# Patient Record
Sex: MALE | Race: WHITE
[De-identification: names, ages, dates, MRNs, and addresses within clinical notes are randomized per-mention and may not be internally consistent; named-entity substitution may affect disease eponyms.]

---

## 2017-02-04 ENCOUNTER — HOSPITAL ENCOUNTER (INPATIENT)
Dept: HOSPITAL 25 - ED | Age: 42
LOS: 2 days | Discharge: HOME | DRG: 53 | End: 2017-02-06
Attending: INTERNAL MEDICINE | Admitting: HOSPITALIST
Payer: COMMERCIAL

## 2017-02-04 DIAGNOSIS — R74.8: ICD-10-CM

## 2017-02-04 DIAGNOSIS — F14.10: ICD-10-CM

## 2017-02-04 DIAGNOSIS — Z88.0: ICD-10-CM

## 2017-02-04 DIAGNOSIS — E86.1: ICD-10-CM

## 2017-02-04 DIAGNOSIS — E87.1: ICD-10-CM

## 2017-02-04 DIAGNOSIS — Z82.49: ICD-10-CM

## 2017-02-04 DIAGNOSIS — F10.10: ICD-10-CM

## 2017-02-04 DIAGNOSIS — G40.909: Primary | ICD-10-CM

## 2017-02-04 LAB
ALBUMIN SERPL BCG-MCNC: 4.5 G/DL (ref 3.2–5.2)
ALP SERPL-CCNC: 85 U/L (ref 34–104)
ALT SERPL W P-5'-P-CCNC: 33 U/L (ref 7–52)
ANION GAP SERPL CALC-SCNC: 10 MMOL/L (ref 2–11)
APAP SERPL-MCNC: < 15 MCG/ML
AST SERPL-CCNC: 46 U/L (ref 13–39)
BUN SERPL-MCNC: 9 MG/DL (ref 6–24)
BUN/CREAT SERPL: 9.1 (ref 8–20)
CALCIUM SERPL-MCNC: 9.7 MG/DL (ref 8.6–10.3)
CHLORIDE SERPL-SCNC: 80 MMOL/L (ref 101–111)
CK SERPL-CCNC: 480 U/L (ref 10–223)
GLOBULIN SER CALC-MCNC: 4 G/DL (ref 2–4)
GLUCOSE SERPL-MCNC: 111 MG/DL (ref 70–100)
HCO3 SERPL-SCNC: 31 MMOL/L (ref 22–32)
HCT VFR BLD AUTO: 48 % (ref 42–52)
HGB BLD-MCNC: 15.9 G/DL (ref 14–18)
MAGNESIUM SERPL-MCNC: 2.6 MG/DL (ref 1.9–2.7)
MCH RBC QN AUTO: 31 PG (ref 27–31)
MCHC RBC AUTO-ENTMCNC: 34 G/DL (ref 31–36)
MCV RBC AUTO: 92 FL (ref 80–94)
POTASSIUM SERPL-SCNC: 3.5 MMOL/L (ref 3.5–5)
PROT SERPL-MCNC: 8.5 G/DL (ref 6.4–8.9)
RBC # BLD AUTO: 5.17 10^6/UL (ref 4–5.4)
SODIUM SERPL-SCNC: 121 MMOL/L (ref 133–145)
TROPONIN I SERPL-MCNC: 0.05 NG/ML (ref ?–0.04)
TSH SERPL-ACNC: 1.72 MCIU/ML (ref 0.34–5.6)
WBC # BLD AUTO: 11.7 10^3/UL (ref 3.5–10.8)

## 2017-02-04 PROCEDURE — 80307 DRUG TEST PRSMV CHEM ANLYZR: CPT

## 2017-02-04 PROCEDURE — 36415 COLL VENOUS BLD VENIPUNCTURE: CPT

## 2017-02-04 PROCEDURE — 93306 TTE W/DOPPLER COMPLETE: CPT

## 2017-02-04 PROCEDURE — 82140 ASSAY OF AMMONIA: CPT

## 2017-02-04 PROCEDURE — 85025 COMPLETE CBC W/AUTO DIFF WBC: CPT

## 2017-02-04 PROCEDURE — 82550 ASSAY OF CK (CPK): CPT

## 2017-02-04 PROCEDURE — 70450 CT HEAD/BRAIN W/O DYE: CPT

## 2017-02-04 PROCEDURE — 80053 COMPREHEN METABOLIC PANEL: CPT

## 2017-02-04 PROCEDURE — 70551 MRI BRAIN STEM W/O DYE: CPT

## 2017-02-04 PROCEDURE — G0378 HOSPITAL OBSERVATION PER HR: HCPCS

## 2017-02-04 PROCEDURE — 80329 ANALGESICS NON-OPIOID 1 OR 2: CPT

## 2017-02-04 PROCEDURE — 84443 ASSAY THYROID STIM HORMONE: CPT

## 2017-02-04 PROCEDURE — 71010: CPT

## 2017-02-04 PROCEDURE — G0480 DRUG TEST DEF 1-7 CLASSES: HCPCS

## 2017-02-04 PROCEDURE — 84484 ASSAY OF TROPONIN QUANT: CPT

## 2017-02-04 PROCEDURE — 87641 MR-STAPH DNA AMP PROBE: CPT

## 2017-02-04 PROCEDURE — 93005 ELECTROCARDIOGRAM TRACING: CPT

## 2017-02-04 PROCEDURE — 80048 BASIC METABOLIC PNL TOTAL CA: CPT

## 2017-02-04 PROCEDURE — 95819 EEG AWAKE AND ASLEEP: CPT

## 2017-02-04 PROCEDURE — 81015 MICROSCOPIC EXAM OF URINE: CPT

## 2017-02-04 PROCEDURE — 83605 ASSAY OF LACTIC ACID: CPT

## 2017-02-04 PROCEDURE — 83735 ASSAY OF MAGNESIUM: CPT

## 2017-02-04 PROCEDURE — 81003 URINALYSIS AUTO W/O SCOPE: CPT

## 2017-02-04 PROCEDURE — 80320 DRUG SCREEN QUANTALCOHOLS: CPT

## 2017-02-04 RX ADMIN — LORAZEPAM SCH MG: 1 TABLET ORAL at 23:08

## 2017-02-04 RX ADMIN — HEPARIN SODIUM SCH UNITS: 5000 INJECTION INTRAVENOUS; SUBCUTANEOUS at 23:07

## 2017-02-04 NOTE — RAD
Indication: Altered mental status. Suspected overdose.



Comparison: None.



Technique: Sitting AP chest



Report: Clear lungs and pleural spaces. Negative for pneumothorax. The heart, pulmonary

vasculature, and mediastinal contours are unremarkable. Unremarkable osseous structures

and soft tissue contours.



IMPRESSION: No evidence for acute intrathoracic disease.

## 2017-02-04 NOTE — ED
Vish WEATHERS Karl, scribed for Willam Diallo MD on 02/04/17 at 1935 .





Substance Abuse/Use





- HPI Summary


HPI Summary: 


40 y/o M BIBA after being found unresponsive by his parents while he was with 

his daughter. EMS on scene said that they arrived as the pt was waking up but 

he was disoriented and having memory problems. Pt stated that he does not 

remember anything from earlier today except that he saw his daughter. Pt has no 

other complaints and no pain. Pt denied SOB, CP, palpitations, nausea, vomiting

, and diarrhea. 





- History Of Current Complaint


Chief Complaint: EDSubstanceAbuse


Stated Complaint: OVERDOSE


Time Seen by Provider: 02/04/17 19:24


Hx Obtained From: Patient, EMS


Onset/Duration  of Drug/ETOH Abuse: Hours


Severity Initially: Moderate


Severity Currently: Moderate


Character: Stuporous


Aggravating Factor(s): Nothing


Alleviating Factor(s): Nothing


Associated Signs And Symptoms: Confused, Altered Mental Status, Other: - memory 

loss





- Allergies/Home Medications


Allergies/Adverse Reactions: 


 Allergies











Allergy/AdvReac Type Severity Reaction Status Date / Time


 


Penicillins Allergy Severe Anaphylatic Verified 02/04/17 19:32





   Shock  














PMH/Surg Hx/FS Hx/Imm Hx


Previously Healthy: Yes


Endocrine/Hematology History: 


   Denies: Hx Diabetes


Cardiovascular History: 


   Denies: Hx Hypertension - not diagnosed but runs highoccassionally, Hx 

Pacemaker/ICD


 History: 


   Denies: Hx Renal Disease


Musculoskeletal History: Reports: Hx Scoliosis - DX AS A CHILD


Sensory History: 


   Denies: Hx Hearing Aid


Neurological History: 


   Denies: Hx Headaches


Psychiatric History: 


   Denies: Hx Panic Disorder





- Surgical History


Surgery Procedure, Year, and Place: right hand reconstruction


Infectious Disease History: 


   Denies: History Other Infectious Disease





- Family History


Known Family History: 


   Negative: Cardiac Disease





- Social History


Alcohol Use: Daily


Alcohol Amount: Pt reports normally drinks 5-6beers daily, patient unsure when 

he last dran


Substance Use Type: Reports: Cocaine


Smoking Status (MU): Never Smoked Tobacco





Review of Systems


Constitutional: Negative


Eyes: Negative


ENT: Negative


Cardiovascular: Negative


Respiratory: Negative


Gastrointestinal: Negative


Genitourinary: Negative


Musculoskeletal: Negative


Skin: Negative


Neurological: Other - AMS, memory loss


Psychological: Other - AMS


All Other Systems Reviewed And Are Negative: Yes





Physical Exam





- Summary


Physical Exam Summary: 





VITAL SIGNS: Reviewed. 


GENERAL: Patient is a well developed and nourished male who is lying 

comfortable in the stretcher. Patient is not in any acute respiratory distress. 


HEAD AND FACE: No signs of trauma. No ecchymosis, hematomas or skull 

depressions. No sinus tenderness. 


EYES: PERRLA, EOMI x 2, No injected conjunctiva, no nystagmus. No photophobia.


EARS: Hearing grossly intact. Ear canals and tympanic membranes are within 

normal limits. 


MOUTH: Oropharynx within normal limits. 


NECK: Supple, trachea is midline, no adenopathy, no JVD, no carotid bruit, no c-

spine tenderness, neck with full ROM. No meningeal signs, no Kernig's or 

brudzinskis signs. 


CHEST: Symmetric, no tenderness at palpation 


LUNGS: Clear to auscultation bilaterally. No wheezing or crackles.


CVS: Regular rate and rhythm, S1 and S2 present, no murmurs or gallops 

appreciated. 


ABDOMEN: Soft, non-tender. No signs of distention. No rebound no guarding, and 

no masses palpated. Bowel sounds are normal. 


EXTREMITIES: FROM in all major joints, no edema, no cyanosis or clubbing.


NEURO: Alert and oriented x 1. No acute neurological deficits. Speech is normal 

and follows commands. 


SKIN: Dry and warm





Vital Signs On Initial Exam: 


 Initial Vitals











Temp Pulse Resp BP Pulse Ox


 


 98.5 F   107   16   132/92   98 


 


 02/04/17 19:21  02/04/17 19:21  02/04/17 19:21  02/04/17 19:21  02/04/17 19:21














Diagnostics





- Vital Signs


 Vital Signs











  Temp Pulse Resp BP Pulse Ox


 


 02/04/17 19:21  98.5 F  107  16  132/92  98














- Laboratory


Lab Results: 


 Lab Results











  02/04/17 02/04/17 02/04/17 Range/Units





  19:39 19:39 19:39 


 


WBC  11.7 H    (3.5-10.8)  10^3/ul


 


RBC  5.17    (4.0-5.4)  10^6/ul


 


Hgb  15.9    (14.0-18.0)  g/dl


 


Hct  48    (42-52)  %


 


MCV  92    (80-94)  fL


 


MCH  31    (27-31)  pg


 


MCHC  34    (31-36)  g/dl


 


RDW  13    (10.5-15)  %


 


Plt Count  241    (150-450)  10^3/ul


 


MPV  7 L    (7.4-10.4)  um3


 


Neut % (Auto)  80.1    (38-83)  %


 


Lymph % (Auto)  6.2 L    (25-47)  %


 


Mono % (Auto)  11.2 H    (1-9)  %


 


Eos % (Auto)  1.7    (0-6)  %


 


Baso % (Auto)  0.8    (0-2)  %


 


Absolute Neuts (auto)  9.3 H    (1.5-7.7)  10^3/ul


 


Absolute Lymphs (auto)  0.7 L    (1.0-4.8)  10^3/ul


 


Absolute Monos (auto)  1.3 H    (0-0.8)  10^3/ul


 


Absolute Eos (auto)  0.2    (0-0.6)  10^3/ul


 


Absolute Basos (auto)  0.1    (0-0.2)  10^3/ul


 


Absolute Nucleated RBC  0.01    10^3/ul


 


Nucleated RBC %  0.1    


 


Sodium   121 L   (133-145)  mmol/L


 


Potassium   3.5   (3.5-5.0)  mmol/L


 


Chloride   80 L   (101-111)  mmol/L


 


Carbon Dioxide   31   (22-32)  mmol/L


 


Anion Gap   10   (2-11)  mmol/L


 


BUN   9   (6-24)  mg/dL


 


Creatinine   0.99   (0.67-1.17)  mg/dL


 


Est GFR ( Amer)   108.2   (>60)  


 


Est GFR (Non-Af Amer)   84.2   (>60)  


 


BUN/Creatinine Ratio   9.1   (8-20)  


 


Glucose   111 H   ()  mg/dL


 


Lactic Acid     (0.5-2.0)  mmol/L


 


Calcium   9.7   (8.6-10.3)  mg/dL


 


Magnesium   2.6   (1.9-2.7)  mg/dL


 


Total Bilirubin   1.40 H   (0.2-1.0)  mg/dL


 


AST   46 H   (13-39)  U/L


 


ALT   33   (7-52)  U/L


 


Alkaline Phosphatase   85   ()  U/L


 


Ammonia    40  (16-53)  mol/L


 


Total Creatine Kinase   480 H   ()  U/L


 


Troponin I   0.05 H*   (<0.04)  ng/mL


 


Total Protein   8.5   (6.4-8.9)  g/dL


 


Albumin   4.5   (3.2-5.2)  g/dL


 


Globulin   4.0   (2-4)  g/dL


 


Albumin/Globulin Ratio   1.1   (1-3)  


 


TSH   Pending   


 


Acetaminophen   Pending   


 


Serum Alcohol   Pending   














  02/04/17 Range/Units





  19:39 


 


WBC   (3.5-10.8)  10^3/ul


 


RBC   (4.0-5.4)  10^6/ul


 


Hgb   (14.0-18.0)  g/dl


 


Hct   (42-52)  %


 


MCV   (80-94)  fL


 


MCH   (27-31)  pg


 


MCHC   (31-36)  g/dl


 


RDW   (10.5-15)  %


 


Plt Count   (150-450)  10^3/ul


 


MPV   (7.4-10.4)  um3


 


Neut % (Auto)   (38-83)  %


 


Lymph % (Auto)   (25-47)  %


 


Mono % (Auto)   (1-9)  %


 


Eos % (Auto)   (0-6)  %


 


Baso % (Auto)   (0-2)  %


 


Absolute Neuts (auto)   (1.5-7.7)  10^3/ul


 


Absolute Lymphs (auto)   (1.0-4.8)  10^3/ul


 


Absolute Monos (auto)   (0-0.8)  10^3/ul


 


Absolute Eos (auto)   (0-0.6)  10^3/ul


 


Absolute Basos (auto)   (0-0.2)  10^3/ul


 


Absolute Nucleated RBC   10^3/ul


 


Nucleated RBC %   


 


Sodium   (133-145)  mmol/L


 


Potassium   (3.5-5.0)  mmol/L


 


Chloride   (101-111)  mmol/L


 


Carbon Dioxide   (22-32)  mmol/L


 


Anion Gap   (2-11)  mmol/L


 


BUN   (6-24)  mg/dL


 


Creatinine   (0.67-1.17)  mg/dL


 


Est GFR ( Amer)   (>60)  


 


Est GFR (Non-Af Amer)   (>60)  


 


BUN/Creatinine Ratio   (8-20)  


 


Glucose   ()  mg/dL


 


Lactic Acid  2.6 H*  (0.5-2.0)  mmol/L


 


Calcium   (8.6-10.3)  mg/dL


 


Magnesium   (1.9-2.7)  mg/dL


 


Total Bilirubin   (0.2-1.0)  mg/dL


 


AST   (13-39)  U/L


 


ALT   (7-52)  U/L


 


Alkaline Phosphatase   ()  U/L


 


Ammonia   (16-53)  mol/L


 


Total Creatine Kinase   ()  U/L


 


Troponin I   (<0.04)  ng/mL


 


Total Protein   (6.4-8.9)  g/dL


 


Albumin   (3.2-5.2)  g/dL


 


Globulin   (2-4)  g/dL


 


Albumin/Globulin Ratio   (1-3)  


 


TSH   


 


Acetaminophen   


 


Serum Alcohol   











Result Diagrams: 


 02/04/17 19:39





 02/04/17 19:39


Lab Statement: Any lab studies that have been ordered have been reviewed, and 

results considered in the medical decision making process.





- Radiology


  ** CXR


Xray Interpretation: No Acute Changes


Radiology Interpretation Completed By: Radiologist - IMPRESSION: No evidence 

for acute intrathoracic disease.





- CT


  ** CT Brain


CT Interpretation: No Acute Changes


CT Interpretation Completed By: Radiologist - IMPRESSION: Negative unenhanced 

head CT.





- EKG


  ** 19:22


EKG Interpretation: NSR at 98 bpm, No ST elevations





Course/Dx





- Course


Course Of Treatment: Urine tox will f/u by Dr. Funes


Assessment/Plan: 40 y/o M GINA after being found unresponsive by his parents 

while he was with his daughter. EMS on scene said that they arrived as the pt 

was waking up but he was disoriented and having memory problems. Pt stated that 

he does not remember anything from earlier today except that he saw his 

daughter. Pt has no other complaints and no pain. Pt denied SOB, CP, 

palpitations, nausea, vomiting, and diarrhea.  Blood work shows a WBCs 11.7. 

Na 121, glucose 111, total creatine kinase 480 and troponin 0.05.  CXR 

impression: negative for an acute pathology.  Head CT impression: Negative for 

acute intracranial pathology.  Likely the confusion is secondary to 

Hyponatremia. He reports he used Cocaine and may be the cause of increased 

troponin. He was given aspirin and held the nitroglycerin since he has no CP. 

He was given IV fluids for his rhabdomyolysis.  I discuss my physical exam, 

findings and test results with Dr. Funes  from the hospitalist services and 

she agrees to admit patient to his services. Patient is hemodynamically stable.





- Diagnoses


Differential Diagnosis/HQI/PQRI: Positive: Alcohol Abuse, Anxiety, Depression, 

Drug Abuse, Metabolic Disorder, Other - CVA


Provider Diagnoses: 


 Mental status change, Confusion, Hyponatremia, Elevated troponin, 

Rhabdomyolysis








- Physician Notifications


Discussed Care Of Patient With: Dr. Funes (Hospitalist) at 20:25 who agreed to 

admit the pt.





Discharge





- Discharge Plan


Condition: Stable


Disposition: ADMITTED TO [facility] documentation as recorded by the Vish gruber Karl accurately reflects 

the service I personally performed and the decisions made by me, Willam Diallo MD.

## 2017-02-04 NOTE — RAD
Indication: Altered mental status. Suspected overdose.



Comparison: November 03, 2015



Technique: Noncontrast CT vertex of skull through foramen magnum.



Report: The sulci, ventricles, and basal cisterns are normal for age. Grey matter white

matter differentiation is preserved without evidence for edema. No intra or extra axial

hemorrhage, mass, or fluid collection detected. Unremarkable orbital contents.



Unremarkable calvarium and skull base. Unremarkable scalp.



The visualized paranasal sinuses and mastoid air spaces are clear. 



IMPRESSION: Negative unenhanced head CT.

## 2017-02-05 LAB
ANION GAP SERPL CALC-SCNC: 6 MMOL/L (ref 2–11)
BUN SERPL-MCNC: 7 MG/DL (ref 6–24)
BUN/CREAT SERPL: 8.5 (ref 8–20)
CALCIUM SERPL-MCNC: 8.7 MG/DL (ref 8.6–10.3)
CHLORIDE SERPL-SCNC: 97 MMOL/L (ref 101–111)
GLUCOSE SERPL-MCNC: 72 MG/DL (ref 70–100)
HCO3 SERPL-SCNC: 24 MMOL/L (ref 22–32)
HCT VFR BLD AUTO: 43 % (ref 42–52)
HGB BLD-MCNC: 14.6 G/DL (ref 14–18)
MCH RBC QN AUTO: 32 PG (ref 27–31)
MCHC RBC AUTO-ENTMCNC: 34 G/DL (ref 31–36)
MCV RBC AUTO: 93 FL (ref 80–94)
POTASSIUM SERPL-SCNC: 3.4 MMOL/L (ref 3.5–5)
RBC # BLD AUTO: 4.62 10^6/UL (ref 4–5.4)
SODIUM SERPL-SCNC: 127 MMOL/L (ref 133–145)
TROPONIN I SERPL-MCNC: 0.09 NG/ML (ref ?–0.04)
WBC # BLD AUTO: 9.1 10^3/UL (ref 3.5–10.8)

## 2017-02-05 RX ADMIN — HEPARIN SODIUM SCH UNITS: 5000 INJECTION INTRAVENOUS; SUBCUTANEOUS at 21:18

## 2017-02-05 RX ADMIN — HEPARIN SODIUM SCH UNITS: 5000 INJECTION INTRAVENOUS; SUBCUTANEOUS at 15:32

## 2017-02-05 RX ADMIN — Medication SCH MG: at 09:53

## 2017-02-05 RX ADMIN — LORAZEPAM SCH MG: 1 TABLET ORAL at 05:20

## 2017-02-05 RX ADMIN — FOLIC ACID SCH MG: 1 TABLET ORAL at 09:53

## 2017-02-05 RX ADMIN — HEPARIN SODIUM SCH UNITS: 5000 INJECTION INTRAVENOUS; SUBCUTANEOUS at 05:20

## 2017-02-05 RX ADMIN — LORAZEPAM SCH MG: 2 INJECTION INTRAMUSCULAR; INTRAVENOUS at 11:42

## 2017-02-05 RX ADMIN — THERA TABS SCH TAB: TAB at 09:53

## 2017-02-05 NOTE — PN
Subjective


Date of Service: 02/05/17


Interval History: 





Pt seen and examined with father at bedside


Pt scoring on WAM and received ativan 


Has no complaints currently, denies SOB, CP, N/V, tremor, anxiety, confusion, 

hallucinations








Objective


Active Medications: 








Acetaminophen (Tylenol Tab*)  650 mg PO Q4H PRN


   PRN Reason: PAIN


   Last Admin: 02/04/17 23:08 Dose:  650 mg


Folic Acid (Folvite Tab*)  1 mg PO DAILY [facility]


   Last Admin: 02/05/17 09:53 Dose:  1 mg


Heparin Sodium (Porcine) (Heparin Vial(*))  5,000 units SUBCUT Q8HR [facility]


   Last Admin: 02/05/17 05:20 Dose:  5,000 units


Sodium Chloride (Ns 0.9% 1000 Ml*)  1,000 mls @ 150 mls/hr IV PER RATE [facility]


   Stop: 02/05/17 20:24


Lorazepam (Ativan Inj*)  0 mg IV .PER WAM SCORE [facility]


   PRN Reason: Protocol


   Last Admin: 02/05/17 11:42 Dose:  2 mg


Lorazepam (Ativan Tab(*))  0 mg PO .PER WAM SCORE [facility]


   PRN Reason: Protocol


Multivitamins/Minerals (Theragran/Minerals Tab*)  1 tab PO DAILY [facility]


   Last Admin: 02/05/17 09:53 Dose:  1 tab


Thiamine HCl (Vitamin B-1 Tab*)  100 mg PO DAILY [facility]


   Last Admin: 02/05/17 09:53 Dose:  100 mg








 Vital Signs











  02/04/17 02/04/17 02/04/17





  20:25 20:30 21:00


 


Temperature   


 


Pulse Rate 106 98 95


 


Respiratory  18 19





Rate   


 


Blood Pressure 152/120 150/91 132/85





(mmHg)   


 


O2 Sat by Pulse 98 98 98





Oximetry   














  02/04/17 02/04/17 02/04/17





  21:30 22:00 23:08


 


Temperature   


 


Pulse Rate 109 98 


 


Respiratory 18 21 16





Rate   


 


Blood Pressure 136/89 131/82 





(mmHg)   


 


O2 Sat by Pulse 96 96 





Oximetry   














  02/04/17 02/04/17 02/05/17





  23:38 23:54 00:54


 


Temperature 98.9 F 101.0 F 98.5 F


 


Pulse Rate 102 112 92


 


Respiratory 16 19 16





Rate   


 


Blood Pressure 141/78 140/87 119/37





(mmHg)   


 


O2 Sat by Pulse 99 99 99





Oximetry   














  02/05/17 02/05/17 02/05/17





  00:56 01:08 03:05


 


Temperature   


 


Pulse Rate 94  86


 


Respiratory  17 





Rate   


 


Blood Pressure 125/65  127/72





(mmHg)   


 


O2 Sat by Pulse   





Oximetry   














  02/05/17 02/05/17 02/05/17





  03:06 04:57 05:20


 


Temperature 98.1 F 98.0 F 


 


Pulse Rate 86 84 


 


Respiratory 16 20 18





Rate   


 


Blood Pressure 127/72 141/74 





(mmHg)   


 


O2 Sat by Pulse 100 100 





Oximetry   














  02/05/17 02/05/17 02/05/17





  07:19 07:20 08:00


 


Temperature 98.4 F  


 


Pulse Rate 64  


 


Respiratory 16 14 14





Rate   


 


Blood Pressure 121/67  





(mmHg)   


 


O2 Sat by Pulse 97  





Oximetry   














  02/05/17 02/05/17 02/05/17





  09:43 11:10 11:42


 


Temperature 98.6 F 98.6 F 


 


Pulse Rate 72 103 


 


Respiratory 18 16 16





Rate   


 


Blood Pressure 136/77 131/86 





(mmHg)   


 


O2 Sat by Pulse 97 100 





Oximetry   











Oxygen Devices in Use Now: None


Appearance: NAD, diaphoretic


Eyes: No Scleral Icterus, PERRLA


Ears/Nose/Mouth/Throat: NL Teeth, Lips, Gums, Clear Oropharnyx, Mucous 

Membranes Moist


Neck: NL Appearance and Movements; NL JVP, Trachea Midline


Respiratory: Symmetrical Chest Expansion and Respiratory Effort, Clear to 

Auscultation


Cardiovascular: NL Sounds; No Murmurs; No JVD, RRR


Abdominal: NL Sounds; No Tenderness; No Distention, No Hepatosplenomegaly


Lymphatic: No Cervical Adenopathy


Extremities: No Edema, No Clubbing, Cyanosis


Skin: No Rash or Ulcers


Neurological: Alert and Oriented x 3


Result Diagrams: 


 02/05/17 08:44





 02/05/17 08:44


Additional Lab and Data: 


 Lab Results











  02/04/17 02/04/17 02/04/17 Range/Units





  19:39 19:39 19:39 


 


WBC  11.7 H    (3.5-10.8)  10^3/ul


 


RBC  5.17    (4.0-5.4)  10^6/ul


 


Hgb  15.9    (14.0-18.0)  g/dl


 


Hct  48    (42-52)  %


 


MCV  92    (80-94)  fL


 


MCH  31    (27-31)  pg


 


MCHC  34    (31-36)  g/dl


 


RDW  13    (10.5-15)  %


 


Plt Count  241    (150-450)  10^3/ul


 


MPV  7 L    (7.4-10.4)  um3


 


Neut % (Auto)  80.1    (38-83)  %


 


Lymph % (Auto)  6.2 L    (25-47)  %


 


Mono % (Auto)  11.2 H    (1-9)  %


 


Eos % (Auto)  1.7    (0-6)  %


 


Baso % (Auto)  0.8    (0-2)  %


 


Absolute Neuts (auto)  9.3 H    (1.5-7.7)  10^3/ul


 


Absolute Lymphs (auto)  0.7 L    (1.0-4.8)  10^3/ul


 


Absolute Monos (auto)  1.3 H    (0-0.8)  10^3/ul


 


Absolute Eos (auto)  0.2    (0-0.6)  10^3/ul


 


Absolute Basos (auto)  0.1    (0-0.2)  10^3/ul


 


Absolute Nucleated RBC  0.01    10^3/ul


 


Nucleated RBC %  0.1    


 


Sodium   121 L   (133-145)  mmol/L


 


Potassium   3.5   (3.5-5.0)  mmol/L


 


Chloride   80 L   (101-111)  mmol/L


 


Carbon Dioxide   31   (22-32)  mmol/L


 


Anion Gap   10   (2-11)  mmol/L


 


BUN   9   (6-24)  mg/dL


 


Creatinine   0.99   (0.67-1.17)  mg/dL


 


Est GFR ( Amer)   108.2   (>60)  


 


Est GFR (Non-Af Amer)   84.2   (>60)  


 


BUN/Creatinine Ratio   9.1   (8-20)  


 


Glucose   111 H   ()  mg/dL


 


Lactic Acid     (0.5-2.0)  mmol/L


 


Calcium   9.7   (8.6-10.3)  mg/dL


 


Magnesium   2.6   (1.9-2.7)  mg/dL


 


Total Bilirubin   1.40 H   (0.2-1.0)  mg/dL


 


AST   46 H   (13-39)  U/L


 


ALT   33   (7-52)  U/L


 


Alkaline Phosphatase   85   ()  U/L


 


Ammonia    40  (16-53)  mol/L


 


Total Creatine Kinase   480 H   ()  U/L


 


Troponin I   0.05 H*   (<0.04)  ng/mL


 


Total Protein   8.5   (6.4-8.9)  g/dL


 


Albumin   4.5   (3.2-5.2)  g/dL


 


Globulin   4.0   (2-4)  g/dL


 


Albumin/Globulin Ratio   1.1   (1-3)  


 


TSH   Pending   


 


Acetaminophen   Pending   


 


Serum Alcohol   Pending   














  02/04/17 Range/Units





  19:39 


 


WBC   (3.5-10.8)  10^3/ul


 


RBC   (4.0-5.4)  10^6/ul


 


Hgb   (14.0-18.0)  g/dl


 


Hct   (42-52)  %


 


MCV   (80-94)  fL


 


MCH   (27-31)  pg


 


MCHC   (31-36)  g/dl


 


RDW   (10.5-15)  %


 


Plt Count   (150-450)  10^3/ul


 


MPV   (7.4-10.4)  um3


 


Neut % (Auto)   (38-83)  %


 


Lymph % (Auto)   (25-47)  %


 


Mono % (Auto)   (1-9)  %


 


Eos % (Auto)   (0-6)  %


 


Baso % (Auto)   (0-2)  %


 


Absolute Neuts (auto)   (1.5-7.7)  10^3/ul


 


Absolute Lymphs (auto)   (1.0-4.8)  10^3/ul


 


Absolute Monos (auto)   (0-0.8)  10^3/ul


 


Absolute Eos (auto)   (0-0.6)  10^3/ul


 


Absolute Basos (auto)   (0-0.2)  10^3/ul


 


Absolute Nucleated RBC   10^3/ul


 


Nucleated RBC %   


 


Sodium   (133-145)  mmol/L


 


Potassium   (3.5-5.0)  mmol/L


 


Chloride   (101-111)  mmol/L


 


Carbon Dioxide   (22-32)  mmol/L


 


Anion Gap   (2-11)  mmol/L


 


BUN   (6-24)  mg/dL


 


Creatinine   (0.67-1.17)  mg/dL


 


Est GFR ( Amer)   (>60)  


 


Est GFR (Non-Af Amer)   (>60)  


 


BUN/Creatinine Ratio   (8-20)  


 


Glucose   ()  mg/dL


 


Lactic Acid  2.6 H*  (0.5-2.0)  mmol/L


 


Calcium   (8.6-10.3)  mg/dL


 


Magnesium   (1.9-2.7)  mg/dL


 


Total Bilirubin   (0.2-1.0)  mg/dL


 


AST   (13-39)  U/L


 


ALT   (7-52)  U/L


 


Alkaline Phosphatase   ()  U/L


 


Ammonia   (16-53)  mol/L


 


Total Creatine Kinase   ()  U/L


 


Troponin I   (<0.04)  ng/mL


 


Total Protein   (6.4-8.9)  g/dL


 


Albumin   (3.2-5.2)  g/dL


 


Globulin   (2-4)  g/dL


 


Albumin/Globulin Ratio   (1-3)  


 


TSH   


 


Acetaminophen   


 


Serum Alcohol   











Microbiology and Other Data: 


 Microbiology











 02/04/17 22:00 Nasal Screen MRSA (PCR)(JOSEPH) - Final





 Nasal    Mrsa Negative














Assess/Plan/Problems-Billing


Assessment: 





42 yo M h/o past drug use, EtOG abuse presenting after episode concerning for 

seizure after heavy drinking and cocaine use





- Patient Problems


(1) Seizure


Comment: Described as shaking by father


Has similar event several years prior also in setting of cocaine use


Check MRI and EEG


seizure precautions    





(2) Alcohol abuse


Comment: Reports 6-8 beers/day with at least 10 night prior to event


seizure in setting of withdrawal?


c/w WAM, thiamine, folic acid


   





(3) Drug abuse


Comment: +cocaine


Father Ed suspects this was not a one time event and told pt he cannot return 

home unless he pursues rehab


SW referral in 


Ed to return tomorrow   





(4) Elevated troponin


Comment: Now downtrending, suspect in setting of seizure and/or cocaine use


check TTE   





(5) Hyponatremia


Comment: suspect hypovolemic in setting of alcohol use


1 additional liter normal saline and check tomorrow    





(6) DVT prophylaxis


Comment: HSQ

## 2017-02-05 NOTE — HP
HISTORY AND PHYSICAL:

 

DATE OF ADMISSION:  02/04/17

 

CHIEF COMPLAINT:  Unresponsive, possible seizure.

 

HISTORY OF PRESENT ILLNESS:  The patient is a 41-year-old gentleman whose 
history is given mostly by his father as the patient was unaware of what 
exactly happened. The patient's father says that his granddaughter, who is the 
patient's daughter, came down upstairs, very upset in their house.  They went 
upstairs to see the patient.  His body was shaking and convulsing.  They did 
not notice if he had any foam coming out of his mouth.  He did not bite his 
tongue.  He did not lose his bladder or bowel control.  They called the EMS and 
they came and took him in the ambulance and he started to wake up in the 
ambulance, but admits he was quite confused and disoriented initially.  In the 
ED, he was still somewhat confused, but finally he is alert and oriented x3 
now.  He admits to doing alcohol 6 to 8 beers a day and did do cocaine last 
night and has done that in the past.  This is not the first time it has 
happened according to the patient.

 

PAST MEDICAL HISTORY:  Significant for drug abuse.

 

MEDICATIONS:  He is on no medications.

 

ALLERGIES:  He has an allergy/adverse reaction to PENICILLIN, which he says is 
anaphylactic.

 

FAMILY HISTORY:  Father is alive at 66, has hypertension.  Mother is alive at 63
, has borderlines diabetes.

 

SOCIAL HISTORY:  No tobacco.  Drinks 6 to 8 beers a day.  Does snort cocaine. 
Works at [employer] in [place].  He is not .  He has 1 child.  His father, 
Timmy Jackson, is his healthcare proxy.

 

REVIEW OF SYSTEMS:  A 14-point review of systems was completed with the 
patient. All pertinent positives and negatives are in the history of present 
illness; otherwise negative.

 

                               PHYSICAL EXAMINATION

 

GENERAL:  Pleasant gentleman, lying in bed, in no acute distress.

 

VITAL SIGNS:  Blood pressure 150/91, pulse ox 98%, respiratory rate 18 breaths 
per minute, heart rate 98 beats per minute, temp 98.5 degrees.

 

HEENT:  Normocephalic, atraumatic.  Pupils equal, round, and reactive to light. 
Moist mucous membranes.

 

NECK:  Supple.  No JVD, bruits, palpable thyroid, or lymphadenopathy.

 

CHEST:  Clear to auscultation and percussion bilaterally.

 

CARDIOVASCULAR:  S1, S2 appreciated.  Regular rate and rhythm.  No murmurs, 
gallops, or rubs.

 

ABDOMEN:  Positive bowel sounds in all 4 quadrants, soft, nontender, 
nondistended.

 

EXTREMITIES:  No cyanosis, clubbing, or edema.  +2 peripheral pulses 
bilaterally.

 

NEUROLOGIC:  Alert and oriented x3.  Moves all extremities.

 

SKIN:  No rashes or abnormalities.

 

 DIAGNOSTIC STUDIES/LAB DATA:  White count 11.7, hemoglobin 15.9, hematocrit 48
, platelets are 241.  Sodium is 121, potassium 3.5, chloride 80, CO2 31, BUN 9, 
creatinine 0.99, glucose is 111, lactic acid 2.6, troponin 0.05.  Urine tox, 
acetaminophen less than 15, alcohol less than 10.  



EKG shows sinus tachycardia at 104 beats per minute.  Normal axis.  No acute ST-
T wave changes.  



Brain CT was interpreted by Radiology as negative unenhanced head CT.  



Chest x-rays shows no evidence for acute intrathoracic disease.

 

ASSESSMENT AND PLAN:

1.  Unresponsive episode, what sounds like possible seizure.  We will admit the 
patient to telemetry.  Neuro checks q.4 hours.  Check MRI.  Check EEG.  We will 
recheck lactic acid and CPK.

2.  Elevated troponin.  I think this is unlikely of significance, but we will 
cycle his troponins while here.  No evidence to suggest he had an myocardial 
infarction, but certainly the cocaine may be the reason for this.

3.  Drug abuse.  We will get social work consult.

4.  FEN.  Regular diet.

5.  DVT prophylaxis.  Heparin subcu.

6.  The patient is a full code.

 

TIME SPENT:  Over 75 minutes were spent on this H and P, more than 40 minutes 
of which were spent direct face-to-face contact with the patient in evaluation, 
physical exam, counseling, and coordination of care.

 

[id number] #: [id number]

AMARI

## 2017-02-06 VITALS — SYSTOLIC BLOOD PRESSURE: 157 MMHG | DIASTOLIC BLOOD PRESSURE: 87 MMHG

## 2017-02-06 LAB
ANION GAP SERPL CALC-SCNC: 7 MMOL/L (ref 2–11)
BUN SERPL-MCNC: 10 MG/DL (ref 6–24)
BUN/CREAT SERPL: 11.6 (ref 8–20)
CALCIUM SERPL-MCNC: 9.1 MG/DL (ref 8.6–10.3)
CHLORIDE SERPL-SCNC: 100 MMOL/L (ref 101–111)
GLUCOSE SERPL-MCNC: 101 MG/DL (ref 70–100)
HCO3 SERPL-SCNC: 23 MMOL/L (ref 22–32)
POTASSIUM SERPL-SCNC: 3.8 MMOL/L (ref 3.5–5)
SODIUM SERPL-SCNC: 130 MMOL/L (ref 133–145)

## 2017-02-06 RX ADMIN — HEPARIN SODIUM SCH: 5000 INJECTION INTRAVENOUS; SUBCUTANEOUS at 15:00

## 2017-02-06 RX ADMIN — HEPARIN SODIUM SCH UNITS: 5000 INJECTION INTRAVENOUS; SUBCUTANEOUS at 05:27

## 2017-02-06 RX ADMIN — LORAZEPAM SCH MG: 2 INJECTION INTRAMUSCULAR; INTRAVENOUS at 01:48

## 2017-02-06 RX ADMIN — THERA TABS SCH TAB: TAB at 08:34

## 2017-02-06 RX ADMIN — Medication SCH MG: at 08:34

## 2017-02-06 RX ADMIN — FOLIC ACID SCH MG: 1 TABLET ORAL at 08:34

## 2017-02-06 NOTE — RAD
Indication: Seizure.



Image sequences: Sagittal and axial T1, axial T2, diffusion, susceptibility weighted

images of the brain were obtained. Coronal T1 and T2 FLAIR images were obtained.



Ventricular structures are midline. No midline shift is noted. The extra-axial spaces are

unremarkable. No restriction of diffusion is noted. No evidence of abnormal signal is

noted on the FLAIR images. Motion artifact grades the images.



The paranasal sinuses demonstrates mild mucosal thickening of the maxillary sinuses

bilaterally.



No evidence of hippocampal asymmetry or abnormality is identified. Optic chiasm and optic

nerves are otherwise unremarkable. The sella turcica is unremarkable. The orbits are

intact.



IMPRESSION: NO INTRACRANIAL LESION IS IDENTIFIED ALTHOUGH MOTION ARTIFACT DEGRADES SOME

IMAGES.

## 2017-02-06 NOTE — ECHO
Patient:      MATEUS PRUETT

Med Rec#:     [id number]            :          1975          

Date:         2017            Age:          41y                 

Account#:     [id number]          Height:       177.8 cm / 70.0 in

Accession#:   [id number]           Weight:       64.4 kg / 141.9 lbs

Sex:          M                     BSA:          1.8

Room#:        448                   

Admit Date#:  2017          

Type:         Inpatient

 

Referring:    Mateus Hernandez MD

Reading:      Vinh Garza MD

Sonographer:  Randa Russo RN RDCS

______________________________________________________________________

 

Transthoracic Echocardiogram

 

Indication:

Elevated troponin levels

BP:           124/74

HR:           76

Rhythm:       NSR

 

Findings     

History:

ETOH and cocaine use 

 

Technical Comments:

The study quality is fair.  Completed at 1050. 

 

Left Ventricle:

The left ventricular chamber size is normal. Mild concentric left

ventricular hypertrophy is observed. There is normal left ventricular

systolic function. The estimated ejection fraction is 55-60%.  Normal

left ventricular diastolic filling is observed. The  basal inferior wall

segment is hypokinetic (score 2). Overall wallmotion score index is 

2.00 

 

Left Atrium:

The left atrial chamber size is normal. 

 

Right Ventricle:

The right ventricular cavity size is normal. The right ventricular

global systolic function is low normal. 

 

Right Atrium:

The right atrium is slightly dilated.  

 

Aortic Valve:

The aortic valve is trileaflet. The aortic valve leaflets are mildly

thickened. There is a trace of aortic regurgitation. There is no

evidence of aortic stenosis. 

 

Mitral Valve:

The mitral valve leaflets are mildly thickened. There is a trace of

mitral regurgitation. There is no evidence of mitral stenosis. 

 

Tricuspid Valve:

The tricuspid valve leaflets are normal.  There is trace tricuspid

regurgitation.  Unable to estimate the right ventricular systolic

pressure.   

 

Pulmonic Valve:

The pulmonic valve appears normal. There is a trace pulmonic

regurgitation.  There is no pulmonic stenosis.  

 

Pericardium:

There is no significant pericardial effusion. A pericardial fat pad is

visualized. 

 

Aorta:

There is no dilatation of the ascending aorta. There is no dilatation of

the aortic arch. There is no dilation of the aortic root. 

 

Pulmonary Artery:

The main pulmonary artery appears normal. 

 

Venous:

The inferior vena cava appears normal in size. There is a greater than

50% respiratory change in the inferior vena cava dimension. 

 

Conclusions

Mild concentric left ventricular hypertrophy is observed.

There is normal left ventricular systolic function.

The estimated ejection fraction is 55-60%. 

The  basal inferior wall segment is hypokinetic (score 2).

The right ventricular global systolic function is low normal.

The aortic valve leaflets are mildly thickened.

There is a trace of aortic regurgitation.

There is a trace of mitral regurgitation.

There is trace tricuspid regurgitation. 

Unable to estimate the right ventricular systolic pressure.  

There is no significant pericardial effusion.

There is no dilatation of the ascending aorta.

 

Measurements     

Name                    Value         Normal Range            

RVIDd (AP) 2D           2.5 cm        (0.9 - 2.6)             

RVDdMajor (2D)          3.2 cm        (2.2 - 4.4)             

RAd ISD 4CH             5 cm          (3.4 - 4.9)             

RA (A4C)W               3.9 cm        (2.9 - 4.6)             

IVSd (2D)               1.2 cm        (0.6 - 1)               

LVPWd (2D)              1.2 cm        (0.6 - 1)               

LVIDd (2D)              4.7 cm        (3.6 - 5.4)             

LVIDs (2D)              3.3 cm        -                        

LV FS (2D)              30 %          (25 - 45)               

Aortic Annulus          2.3 cm        (1.4 - 2.6)             

Ao root diameter (2D)   3.3 cm        (2.1 - 3.5)             

Ascending Ao            3 cm          (2.1 - 3.4)             

Aortic arch             2.8 cm        (1.8 - 3.4)             

LA dimension (AP) 2D    3.2 cm        (2.3 - 3.8)             

LAd ISD 4CH             4.8 cm        (2.9 - 5.3)             

LA ISD 4CH W            3.8 cm        (2.5 - 4.5)             

 

Name                    Value         Normal Range            

LA ESV SP 4CH (A/L)     39 ml         -                        

LA ESV SP 2CH (A/L)     48 ml         -                        

LA ESV BP (A/L)         45 ml         -                        

LA ESV BP (A/L) index   24.7 ml/m2    -                        

LA ESV SP 4CH (MOD)     36 ml         -                        

LA ESV SP 2CH (MOD)     46 ml         -                        

 

Name                    Value         Normal Range            

MV E-wave Vmax          0.92 m/sec    -                        

MV deceleration time    167 msec      -                        

MV A-wave Vmax          0.71 m/sec    -                        

MV E:A ratio            1.3 ratio     -                        

LV septal e' Vmax       0.12 m/sec    -                        

LV lateral e' Vmax      0.16 m/sec    -                        

LV E:e' septal ratio    7.7 ratio     -                        

LV E:e' lateral ratio   5.8 ratio     -                        

 

Name                    Value         Normal Range            

AV Vmax                 1.3 m/sec     -                        

AV VTI                  27 cm         -                        

AV peak gradient        6 mmHg        -                        

AV mean gradient        4 mmHg        -                        

LVOT Vmax               1 m/sec       -                        

LVOT VTI                22.8 cm       -                        

LVOT peak gradient      4 mmHg        -                        

LVOT mean gradient      3 mmHg        -                        

MEGHAN Vmax                0.76 m/sec    -                        

 

Name                    Value         Normal Range            

IVC diameter            1.5 cm        -                        

 

Name                    Value         Normal Range            

PV Vmax                 0.75 m/sec    -                        

 

Wallmotion

 

BAS          Not Seen

BA           Not Seen

BAL          Not Seen

MAISHA          Not Seen

BI           Hypokinetic

BIS          Not Seen

MAS          Not Seen

MA           Not Seen

MAL          Not Seen

MIL          Not Seen

MI           Not Seen

MIS          Not Seen

AS           Not Seen

AA           Not Seen

AL           Not Seen

AI           Not Seen

APEX         Not Seen

 

Electronically signed by: Vinh Garza MD on 2017 12:23:41

## 2017-02-07 NOTE — EEG
ELECTROENCEPHALOGRAPHY:

 

DATE OF STUDY:  02/06/17

 

LOCATION:  He is an inpatient in room 448.

 

REFERRING PHYSICIAN:  Dr. Funes.

 

CLINICAL PROBLEM:  Seizure in the setting of possible drug abuse.

 

CURRENT MEDICATIONS:  Include:

1.  Lorazepam.

2.  Vitamins.

3.  Heparin.

 

REPORT:  This 16-channel EEG is remarkable for background activity consisting 
of a reasonably well-formed alpha rhythm in the posterior derivations at 9 
cycles per second, which is symmetric and suppressed by eye opening.  Abundant 
beta rhythms are seen bifrontally and parasagittally.  The patient is 
clinically awake. Activation procedures are not attempted.  The patient does 
drowse and sleep with vertex and bitemporal slowing noted.  Sleep spindles are 
also noted in addition to excessive beta activity.  The patient awakes with a 
normal arousal response.  There are no focal, lateralized, or epileptiform 
abnormalities.

 

CLINICAL IMPRESSION:  Normal awake and asleep EEG.

 

[id number]/[facility] #: [id number]

MTDD

## 2017-02-07 NOTE — DS
DISCHARGE SUMMARY:

 

DATE OF ADMISSION:  02/04/17

 

DATE OF DISCHARGE:  02/06/17

 

PRIMARY DIAGNOSIS:  Seizure.

 

SECONDARY DIAGNOSES:

1.  Alcohol abuse.

2.  Cocaine abuse.

 

HISTORY OF PRESENT ILLNESS:  This 41-year-old man with past medical history of 
heavy alcohol drinking, drinks about 8 to 10 beers per day, who has been in his 
usual state of health, was found shaking in his room after using cocaine, 
brought to the hospital, confused, became ultimately more alert.  He underwent 
an MRI of his head which did not show any intracranial masses or lesions.  He 
was noted with a troponin that peaked at 0.09 for trending down.  Denied any 
chest pain, suspected in the setting of cocaine use.  He was noted to have 
normal left ventricular systolic function, although a basal inferior wall 
segment is hypokinetic score of 2. He remained chest pain free without symptoms
, ambulating around the unit prior to leaving.  His father did give him an 
ultimatum that he could not return home unless he enrolled in a rehab.  The 
patient was hesitant, although ultimately accepted a referral to [facility] for rehab 
and will return to live his parents.  It was noted that the patient was found 
unresponsive by his 7-year-old daughter, and referral to [facility] was made by our 
social workers.

 

Reasons to return to the hospital including, but not limited to recurrent 
worsening of symptoms, chest pain, shortness of breath, nausea, vomiting, 
lightheadedness, loss of consciousness, shaking, confusion, fevers, chills, 
night sweats discussed with the patient.  He acknowledged understanding.

 

The patient was advised not to drive.  The patient was advised to avoid 
activities that would be hazardous if he should have sudden loss of 
consciousness, including operating heavy machinery, tools, walking on roads, 
biking.  He acknowledged understanding.  No medications on discharge.

 

 [id number]/[facility] #: [id number]

MTDD

## 2018-08-26 ENCOUNTER — HOSPITAL ENCOUNTER (EMERGENCY)
Dept: HOSPITAL 25 - UCCORT | Age: 43
Discharge: HOME | End: 2018-08-26
Payer: COMMERCIAL

## 2018-08-26 VITALS — DIASTOLIC BLOOD PRESSURE: 114 MMHG | SYSTOLIC BLOOD PRESSURE: 151 MMHG

## 2018-08-26 DIAGNOSIS — Z88.0: ICD-10-CM

## 2018-08-26 DIAGNOSIS — F10.129: ICD-10-CM

## 2018-08-26 DIAGNOSIS — H91.90: Primary | ICD-10-CM

## 2018-08-26 PROCEDURE — 99211 OFF/OP EST MAY X REQ PHY/QHP: CPT

## 2018-08-26 PROCEDURE — G0463 HOSPITAL OUTPT CLINIC VISIT: HCPCS

## 2018-08-26 NOTE — ED
Throat Pain/Nasal Congestion





- HPI Summary


HPI Summary: 


43 year old male presents with complaints of sudden onset of decreased hearing 

at about 16:00 today. 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 Describes symptoms 

as "everyone sounds like the chipmunks". Denies fever, chills, headache, vertigo

, tinnitus, ear pain, or drainage. There is a strong smell of ETOH on patient's 

breath at time of exam. Notable slurring of speech and patient provides a 

limited history. He has documented history of ETOH abuse.








- History of Current Complaint


Chief Complaint: UCEar


Time Seen by Provider: 08/26/18 20:21


Hx Obtained From: Patient


Onset/Duration: Sudden Onset, Lasting Hours


Associated Signs And Symptoms: Positive: Negative





- Allergies/Home Medications


Allergies/Adverse Reactions: 


 Allergies











Allergy/AdvReac Type Severity Reaction Status Date / Time


 


Penicillins Allergy  Anaphylatic Verified 08/26/18 20:10





   Shock  











Home Medications: 


 Home Medications





NK [No Home Medications Reported]  08/26/18 [History Confirmed 08/26/18]











PMH/Surg Hx/FS Hx/Imm Hx


Endocrine/Hematology History: 


   Denies: Hx Diabetes, Hx Thyroid Disease


Cardiovascular History: 


   Denies: Hx Coronary Artery Disease, Hx Hypertension - not diagnosed but runs 

highoccassionally


 History: 


   Denies: Hx Renal Disease


Musculoskeletal History: Reports: Hx Scoliosis - DX AS A CHILD


Sensory History: 


   Denies: Hx Hearing Problem, Hx Auditory Problems, Other Sensory Impairments


Neurological History: Reports: Hx Seizures


   Denies: Hx CVA, Hx Headaches, Hx Migraine, Hx Transient Ischemic Attacks (TIA

)





- Surgical History


Surgery Procedure, Year, and Place: right hand reconstruction





- Immunization History


Date of Tetanus Vaccine: unk


Date of Influenza Vaccine: unk


Infectious Disease History: No


Infectious Disease History: 


   Denies: History Other Infectious Disease, Traveled Outside the [place] in Last 30 

Days





- Family History


Family History: noncontributory





- Social History


Occupation: Employed Full-time


Lives: With Family


Alcohol Use: Daily


Alcohol Amount: Pt reports normally drinks 5-6beers daily, patient unsure when 

he last dran


Hx Substance Use: Yes


Substance Use Type: Reports: Cocaine


Smoking Status (MU): Never Smoked Tobacco





Review of Systems


Constitutional: Negative


Eyes: Negative


Positive: Other - See HPI


Cardiovascular: Negative


Respiratory: Negative


Gastrointestinal: Negative


Neurological: Negative


All Other Systems Reviewed And Are Negative: Yes





Physical Exam


Triage Information Reviewed: Yes


Vital Signs On Initial Exam: 


 Initial Vitals











Temp Pulse Resp BP Pulse Ox


 


 98.6 F   77   18   151/114   97 


 


 08/26/18 20:01  08/26/18 20:01  08/26/18 20:01  08/26/18 20:01  08/26/18 20:01











Vital Signs Reviewed: Yes


Appearance: Positive: No Pain Distress


Skin: Positive: Warm, Skin Color Reflects Adequate Perfusion, Dry


Head/Face: Positive: Normal Head/Face Inspection


Eyes: Positive: Normal


ENT: Positive: Pharynx normal, TMs normal, Uvula midline


Neck: Positive: Supple, Nontender, No Lymphadenopathy


Respiratory/Lung Sounds: Positive: Breath Sounds Present, Decreased Breath 

Sounds


Cardiovascular: Positive: Normal, RRR


Neurological: Positive: Other - Unable to obtain adequate neuro exam d/t 

intoxication


Psychiatric: Positive: Affect/Mood Appropriate





Diagnostics





- Vital Signs


 Vital Signs











  Temp Pulse Resp BP Pulse Ox


 


 08/26/18 20:01  98.6 F  77  18  151/114  97














- Laboratory


Lab Statement: Any lab studies that have been ordered have been reviewed, and 

results considered in the medical decision making process.





EENT Course/Dx





- Course


Course Of Treatment: 43 year old male with complaints of acute onset of 

decreased hearing that started at approximately 16:00 today. 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 

hearing protection. Patient was highly intoxicated at time of exam which 

limited ability to acquire adequate history. Exam was unremarkable and no 

obvious cause for symptoms apparent. Will refer patient to ENT for futher 

evaluation.


Assessment/Plan: acute hearing loss





- Differential Diagnoses


Differential Diagnoses: Cerumen Impaction, Otitis Media





- Diagnoses


Provider Diagnoses: 


 Alcohol abuse








Discharge





- Sign-Out/Discharge


Documenting (check all that apply): Patient Departure


All imaging exams completed and their final reports reviewed: No Studies





- Discharge Plan


Condition: Stable


Disposition: HOME


Patient Education Materials:  Hearing Loss (ED)


Referrals: 


Riya Schaefer MD [Primary Care Provider] - 3 Days (For recheck of high blood 

pressure.)


Cryer,Jonathan, MD [Medical Doctor] - 5 Days (Call for appointment for 

evaluation of hearing loss)


Additional Instructions: 


There was no clear cause of your hearing disturbance from your exam. I am 

referring you to Otolarygology (Ear, Nose, and Throat) for further evaluation.





Be sure you use hearing protection when working in loud areas or with power 

tools.





Your blood pressure was very elevated in the clinic today. You need to follow 

up with your primary care provider within the next 3 days for recheck.





- Billing Disposition and Condition


Condition: STABLE


Disposition: Home